# Patient Record
Sex: MALE | Race: OTHER
[De-identification: names, ages, dates, MRNs, and addresses within clinical notes are randomized per-mention and may not be internally consistent; named-entity substitution may affect disease eponyms.]

---

## 2021-07-16 ENCOUNTER — HOSPITAL ENCOUNTER (OUTPATIENT)
Dept: HOSPITAL 53 - M RAD | Age: 41
End: 2021-07-16
Attending: PHYSICIAN ASSISTANT
Payer: COMMERCIAL

## 2021-07-16 DIAGNOSIS — M79.674: Primary | ICD-10-CM

## 2021-07-16 NOTE — REP
INDICATION:

HIT R GREAT TOE ON REEF 1 WEEK AGO.



COMPARISON:

None.



TECHNIQUE:

Four views of the right great toe.



FINDINGS:

Four views of the right great toe demonstrate normal bones, joints, and soft tissues.

No fracture or subluxation is seen.  No opaque foreign body noted.





IMPRESSION:

Negative right great toe series.







<Electronically signed by Ignacio Josue > 07/16/21 [id number]

## 2022-07-19 ENCOUNTER — HOSPITAL ENCOUNTER (EMERGENCY)
Dept: HOSPITAL 53 - M ED | Age: 42
Discharge: HOME | End: 2022-07-19
Payer: COMMERCIAL

## 2022-07-19 VITALS — BODY MASS INDEX: 32.65 KG/M2 | WEIGHT: 220.46 LBS | HEIGHT: 69 IN

## 2022-07-19 VITALS — DIASTOLIC BLOOD PRESSURE: 93 MMHG | SYSTOLIC BLOOD PRESSURE: 143 MMHG

## 2022-07-19 DIAGNOSIS — R07.0: ICD-10-CM

## 2022-07-19 DIAGNOSIS — Z79.899: ICD-10-CM

## 2022-07-19 DIAGNOSIS — R05.9: Primary | ICD-10-CM

## 2022-07-19 DIAGNOSIS — R09.81: ICD-10-CM

## 2022-07-19 DIAGNOSIS — F17.200: ICD-10-CM

## 2022-07-19 DIAGNOSIS — Z79.51: ICD-10-CM

## 2023-02-09 ENCOUNTER — HOSPITAL ENCOUNTER (OUTPATIENT)
Dept: HOSPITAL 53 - M RAD | Age: 43
End: 2023-02-09
Attending: PHYSICIAN ASSISTANT
Payer: COMMERCIAL
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
## 2023-02-09 DIAGNOSIS — S46.012A: ICD-10-CM

## 2023-02-09 DIAGNOSIS — M25.512: Primary | ICD-10-CM
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
## 2023-02-09 PROCEDURE — 73223 MRI JOINT UPR EXTR W/O&W/DYE: CPT

## 2023-02-09 PROCEDURE — 23350 INJECTION FOR SHOULDER X-RAY: CPT

## 2023-02-09 PROCEDURE — 77002 NEEDLE LOCALIZATION BY XRAY: CPT
